# Patient Record
Sex: MALE
[De-identification: names, ages, dates, MRNs, and addresses within clinical notes are randomized per-mention and may not be internally consistent; named-entity substitution may affect disease eponyms.]

---

## 2021-12-13 ENCOUNTER — APPOINTMENT (OUTPATIENT)
Dept: OTOLARYNGOLOGY | Facility: CLINIC | Age: 41
End: 2021-12-13
Payer: COMMERCIAL

## 2021-12-13 DIAGNOSIS — Z82.49 FAMILY HISTORY OF ISCHEMIC HEART DISEASE AND OTHER DISEASES OF THE CIRCULATORY SYSTEM: ICD-10-CM

## 2021-12-13 DIAGNOSIS — J30.2 OTHER SEASONAL ALLERGIC RHINITIS: ICD-10-CM

## 2021-12-13 DIAGNOSIS — Z80.9 FAMILY HISTORY OF MALIGNANT NEOPLASM, UNSPECIFIED: ICD-10-CM

## 2021-12-13 PROBLEM — Z00.00 ENCOUNTER FOR PREVENTIVE HEALTH EXAMINATION: Status: ACTIVE | Noted: 2021-12-13

## 2021-12-13 PROCEDURE — 99204 OFFICE O/P NEW MOD 45 MIN: CPT | Mod: 25

## 2021-12-13 PROCEDURE — 31231 NASAL ENDOSCOPY DX: CPT

## 2021-12-13 RX ORDER — TRIAMCINOLONE ACETONIDE 55 UG/1
55 SPRAY, METERED NASAL
Refills: 0 | Status: ACTIVE | COMMUNITY

## 2021-12-13 NOTE — CONSULT LETTER
[Dear  ___] : Dear  [unfilled], [Consult Letter:] : I had the pleasure of evaluating your patient, [unfilled]. [Please see my note below.] : Please see my note below. [Consult Closing:] : Thank you very much for allowing me to participate in the care of this patient.  If you have any questions, please do not hesitate to contact me. [Sincerely,] : Sincerely, [FreeTextEntry3] : Irene Jeong MD\par

## 2021-12-13 NOTE — ASSESSMENT
[FreeTextEntry1] : He has a history of allergies and nasal surgery (septoplasty and middle turbinate resection). He developed a sinus infection in early October. He took antibiotics and had some improvement but still has persistent symptoms. On nasal endoscopy, he has evidence of prior middle turbinate resection. It is unclear if he had sinus surgery as well. There was some scarring in the middle meatus but the uncinate process, at least on the right side, appeared intact. There was a little bit of drainage on the right side which was cultured.\par \par PLAN\par \par -findings and management options discussed in detail with the patient. \par -Nasal saline rinses, nasal steroid spray, antihistamine/decongestant as needed\par -I am going to place him on a course of doxycycline. He said in the past he had had antibiotics changed as well. He does not like the way oral steroids make him feel so I will hold off on that\par -they were asked to call for the results of the culture in the next few days\par -Consider CT scan of the sinuses depending on how he does\par -He will call Friday for the culture results. He will call me in one to 2 weeks and let me know how he is feeling. If he is not feeling better, I will send him for the CT scan\par -call and return earlier if any concerns. \par

## 2021-12-13 NOTE — HISTORY OF PRESENT ILLNESS
[de-identified] : HAYDEE CAIN is a 41 year patient with history of allergies, chronic sinusitis, and nasal/sinus surgery. He said that he developed an upper respiratory tract infection in early October. After 3 weeks, he went to urgent care and was given Augmentin. He ended up taking it for 7 days and then a 10 day course of a stronger dose. He still has right sinus pressure, cough, postnasal drip, and nasal drainage. He uses Nasacort. He gets about one sinus infection every other year. He sees Dr. Marroquin for allergies. He gets immunotherapy.\par \par He had septoplasty in 1998 in [place].

## 2021-12-21 LAB — EAR NOSE AND THROAT CULTURE: ABNORMAL

## 2021-12-29 ENCOUNTER — APPOINTMENT (OUTPATIENT)
Dept: OTOLARYNGOLOGY | Facility: CLINIC | Age: 41
End: 2021-12-29
Payer: COMMERCIAL

## 2021-12-29 PROCEDURE — 99214 OFFICE O/P EST MOD 30 MIN: CPT | Mod: 25

## 2021-12-29 PROCEDURE — 31231 NASAL ENDOSCOPY DX: CPT

## 2021-12-29 RX ORDER — SULFAMETHOXAZOLE AND TRIMETHOPRIM 800; 160 MG/1; MG/1
800-160 TABLET ORAL TWICE DAILY
Qty: 20 | Refills: 0 | Status: COMPLETED | COMMUNITY
Start: 2021-12-16 | End: 2021-12-29

## 2021-12-29 RX ORDER — DOXYCYCLINE 100 MG/1
100 CAPSULE ORAL TWICE DAILY
Qty: 20 | Refills: 0 | Status: COMPLETED | COMMUNITY
Start: 2021-12-13 | End: 2021-12-29

## 2021-12-29 NOTE — HISTORY OF PRESENT ILLNESS
[de-identified] : HAYDEE CAIN is a 41 year patient is here for follow up for sinusitis. He took doxycycline which was then changed to bactrim based on the culture results. Culture grew rare Klebsiella aerogenes. He is somewhat better but still has right sinus pressure and postnasal drip. He also had a "tingling" sensation.  He is using Nasacort.  His symptoms started in October. \par \par ENT history\par He sees Dr. Marroquin for allergies and gets immunotherapy\par He had septoplasty in [place] in 1998

## 2021-12-29 NOTE — CONSULT LETTER
[Dear  ___] : Dear  [unfilled], [Courtesy Letter:] : I had the pleasure of seeing your patient, [unfilled], in my office today. [Please see my note below.] : Please see my note below. [Consult Closing:] : Thank you very much for allowing me to participate in the care of this patient.  If you have any questions, please do not hesitate to contact me. [Sincerely,] : Sincerely, [FreeTextEntry3] : Irene Jeong MD\par

## 2021-12-29 NOTE — ASSESSMENT
[FreeTextEntry1] : He is feeling somewhat better after taking the antibiotics. However, he still has right facial pressure and tingling sensation. On exam, he has mild edema in the right middle meatus involving the OMC. There was no drainage to culture\par \par PLAN\par \par -findings and management options discussed in detail with the patient. \par -Nasal saline rinses, nasal steroid spray, antihistamine/decongestant as needed\par -I gave him another course of Bactrim to take if needed. He said he may want to try a short burst of oral steroids. He has taken them in the past. He will stop them if he does not like the way they make him feel. I told him to take the medication with food and consider taking Pepcid\par -I am sending him for CT scan of the sinuses\par -Followup after the CT scan\par -call and return earlier if any concerns or worsening symptoms\par

## 2022-01-06 ENCOUNTER — APPOINTMENT (OUTPATIENT)
Dept: CT IMAGING | Facility: CLINIC | Age: 42
End: 2022-01-06
Payer: COMMERCIAL

## 2022-01-06 ENCOUNTER — RESULT REVIEW (OUTPATIENT)
Age: 42
End: 2022-01-06

## 2022-01-06 ENCOUNTER — OUTPATIENT (OUTPATIENT)
Dept: OUTPATIENT SERVICES | Facility: HOSPITAL | Age: 42
LOS: 1 days | End: 2022-01-06

## 2022-01-06 PROCEDURE — 70486 CT MAXILLOFACIAL W/O DYE: CPT | Mod: 26

## 2022-01-10 ENCOUNTER — APPOINTMENT (OUTPATIENT)
Dept: OTOLARYNGOLOGY | Facility: CLINIC | Age: 42
End: 2022-01-10
Payer: COMMERCIAL

## 2022-01-10 PROCEDURE — 99212 OFFICE O/P EST SF 10 MIN: CPT

## 2022-01-10 RX ORDER — SULFAMETHOXAZOLE AND TRIMETHOPRIM 800; 160 MG/1; MG/1
800-160 TABLET ORAL TWICE DAILY
Qty: 20 | Refills: 0 | Status: COMPLETED | COMMUNITY
Start: 2021-12-29 | End: 2022-01-10

## 2022-01-10 RX ORDER — PREDNISONE 10 MG/1
10 TABLET ORAL
Qty: 12 | Refills: 0 | Status: COMPLETED | COMMUNITY
Start: 2021-12-29 | End: 2022-01-10

## 2022-01-10 NOTE — ASSESSMENT
[FreeTextEntry1] : He has been feeling better. His CT scan was reviewed. There was no significant sinus inflammation that there were narrow OMCs\par \par PLAN\par \par -findings and management options discussed in detail with the patient. \par -Nasal saline rinses, nasal steroid spray, antihistamine/decongestant as needed\par -I will monitor his sinuses. If he has recurrent infections, he may benefit from maxillary sinus balloon sinuplasty. We will observe cyst/polyp in the left maxillary sinus\par -Consider neurology evaluation if he continues to have right facial pressure\par -Allergy precautions and allergy management\par -I will see him back in 3-6 months if he is doing well. He will return earlier if he has a sinus infection\par -call and return earlier if any concerns or worsening symptoms\par

## 2022-01-10 NOTE — HISTORY OF PRESENT ILLNESS
[de-identified] : HAYDEE CAIN is a 41 year patient here to review his CT scan.  He has been feeling better. The right facial pressure and tingling sensation have improved. CT scan showed a narrow but patent OMCs, partial middle turbinate resection and a cyst or polyp in the superior aspect of the left maxillary sinus in a segmented compartment\par \par ENT history\par He sees Dr. Marroquin for allergies and gets immunotherapy\par He had septoplasty in [place] in 1998

## 2022-06-13 ENCOUNTER — APPOINTMENT (OUTPATIENT)
Dept: OTOLARYNGOLOGY | Facility: CLINIC | Age: 42
End: 2022-06-13
Payer: COMMERCIAL

## 2022-06-13 DIAGNOSIS — J01.00 ACUTE MAXILLARY SINUSITIS, UNSPECIFIED: ICD-10-CM

## 2022-06-13 PROCEDURE — 31231 NASAL ENDOSCOPY DX: CPT

## 2022-06-13 PROCEDURE — 99213 OFFICE O/P EST LOW 20 MIN: CPT | Mod: 25

## 2022-06-13 RX ORDER — LIDOCAINE HYDROCHLORIDE 20 MG/ML
2 SOLUTION ORAL; TOPICAL
Qty: 200 | Refills: 0 | Status: ACTIVE | COMMUNITY
Start: 2022-06-05

## 2022-06-13 RX ORDER — KETOCONAZOLE 20 MG/G
2 CREAM TOPICAL
Qty: 60 | Refills: 0 | Status: ACTIVE | COMMUNITY
Start: 2022-03-29

## 2022-06-13 RX ORDER — TACROLIMUS 1 MG/G
0.1 OINTMENT TOPICAL
Qty: 60 | Refills: 0 | Status: ACTIVE | COMMUNITY
Start: 2022-03-29

## 2022-06-13 RX ORDER — TRIAMCINOLONE ACETONIDE 1 MG/G
0.1 OINTMENT TOPICAL
Qty: 60 | Refills: 0 | Status: ACTIVE | COMMUNITY
Start: 2022-03-29

## 2022-06-13 NOTE — HISTORY OF PRESENT ILLNESS
[de-identified] : HAYDEE CAIN is a 42 year old patient here for a 4-week history of throat pain/discomfort.  He was not sick when this started.  He has no dysphagia or voice change.  He is not having a lot of nasal obstruction, congestion, or drainage.  He went to urgent care.  He said COVID testing was negative.  He said a rapid strep test was negative as was a throat culture about 2 weeks ago.  He denies history of reflux\par \par ENT history\par He sees Dr. Marroquin for allergies and gets immunotherapy\par He had septoplasty in [place] in 1998 \par CT scan of the sinuses showed a narrow but patent OMCs, partial middle turbinate resection and a cyst or polyp in the superior aspect of the left maxillary sinus in a segmented compartment

## 2022-06-22 ENCOUNTER — APPOINTMENT (OUTPATIENT)
Dept: OTOLARYNGOLOGY | Facility: CLINIC | Age: 42
End: 2022-06-22
Payer: COMMERCIAL

## 2022-06-22 VITALS — WEIGHT: 140 LBS | HEIGHT: 68 IN | TEMPERATURE: 95.4 F | BODY MASS INDEX: 21.22 KG/M2

## 2022-06-22 DIAGNOSIS — Z91.09 OTHER ALLERGY STATUS, OTHER THAN TO DRUGS AND BIOLOGICAL SUBSTANCES: ICD-10-CM

## 2022-06-22 DIAGNOSIS — R07.0 PAIN IN THROAT: ICD-10-CM

## 2022-06-22 PROCEDURE — 99213 OFFICE O/P EST LOW 20 MIN: CPT | Mod: 25

## 2022-06-22 PROCEDURE — 31575 DIAGNOSTIC LARYNGOSCOPY: CPT

## 2022-06-22 NOTE — ASSESSMENT
[FreeTextEntry1] : He has persistent throat discomfort/pain which started approximately 6 weeks ago.  Nasal endoscopy was again unremarkable.  I did send a throat culture to rule out bacterial and fungal infection as well as a right middle meatal culture.  He was concerned about a sinus infection.  However, there is no purulent drainage or congestion seen.  I discussed possible causes of throat pain and nasal congestion including allergies, reflux, and neuralgias.  I discussed the possibility of lesions, TMJ dysfunction or dental source.  \par \par PLAN\par \par -findings and management options discussed in detail with the patient. \par -Nasal saline rinses, nasal steroid spray, antihistamine/decongestant as needed\par -Hydration and avoidance of alcohol based mouthwashes\par -Continue reflux precautions.  He may try Pepcid to see if that helps\par -I asked him to call for the culture results.\par -If his symptoms persist, I recommended pH probe testing to rule out reflux, CT scan, dental evaluation to rule out dental source/TMJ dysfunction and neurology evaluation to rule out other sources\par -I will see how he is feeling when he calls for the culture results.  We will proceed with further work-up if he is not better\par -call and return earlier if any concerns or worsening symptoms\par

## 2022-06-22 NOTE — HISTORY OF PRESENT ILLNESS
[de-identified] : HAYDEE CAIN is a 42 year old patient for follow-up for throat pain.  His symptoms have not changed.  He said that it felt worse over the weekend.  He has a little bit more nasal congestion on the right side and more discomfort.  He has no dysphagia or voice change.  He does have some discomfort when he talks for a long period of time.  He also has some discomfort over the right side of his face.  He has been following reflux precautions.  He did not try any medication for it.  He uses Nasacort and a nasal saline rinse daily.  Nasal endoscopy and flexible laryngoscopy at his last visit were unremarkable.  There was no obvious sinus infection.\par \par He developed throat pain/discomfort approximately 6 weeks ago.  He had no preceding upper respiratory tract infection.  He said a rapid strep test and throat culture were negative.  He said COVID testing was also negative\par \par  ENT history\par He sees Dr. Marroquin for allergies and gets immunotherapy\par He had septoplasty in [place] in 1998 \par CT scan of the sinuses showed a narrow but patent OMCs, partial middle turbinate resection and a cyst or polyp in the superior aspect of the left maxillary sinus in a segmented compartment \par

## 2022-06-27 LAB
BACTERIA THROAT CULT: NORMAL
FUNGUS SPEC CULT ORG #8: ABNORMAL

## 2022-06-27 RX ORDER — NYSTATIN 100000 [USP'U]/ML
100000 SUSPENSION ORAL 3 TIMES DAILY
Qty: 105 | Refills: 1 | Status: ACTIVE | COMMUNITY
Start: 2022-06-27 | End: 1900-01-01

## 2022-06-28 LAB — EAR NOSE AND THROAT CULTURE: ABNORMAL

## 2022-07-11 ENCOUNTER — APPOINTMENT (OUTPATIENT)
Dept: OTOLARYNGOLOGY | Facility: CLINIC | Age: 42
End: 2022-07-11

## 2024-04-16 NOTE — ASSESSMENT
SBAR given to ROSY Lund RN Pre Op Nurse for continuing of care.    [FreeTextEntry1] : He has a 4-week history of throat discomfort.  He said a throat culture was -2 weeks ago.  He does not have a lot of sinus symptoms.  On exam, there was no obvious sinus infection.  Laryngeal exam also appeared normal.\par \par PLAN\par \par -findings and management options discussed in detail with the patient. \par -Nasal saline rinses, nasal steroid spray, antihistamine/decongestant as needed\par -Hydration\par -Avoid alcohol based mouthwashes\par -We discussed the possibility of silent reflux.  I recommended reflux precautions.  He could consider trying OTC medication\par -We also discussed repeat throat culture and/or sinus culture.  There is no purulent drainage to culture on nasal endoscopy.  He had a recent throat culture.  We will hold off on repeating it today.  We also discussed treating him empirically with antibiotics.  We may consider this if he is not improving\par -He will call me or follow-up in 2 weeks if his symptoms have not improved.\par -call and return earlier if any concerns or worsening symptoms\par

## 2024-04-22 ENCOUNTER — APPOINTMENT (OUTPATIENT)
Dept: OTOLARYNGOLOGY | Facility: CLINIC | Age: 44
End: 2024-04-22
Payer: COMMERCIAL

## 2024-04-22 PROCEDURE — 99213 OFFICE O/P EST LOW 20 MIN: CPT

## 2024-04-22 RX ORDER — MUPIROCIN 20 MG/G
2 OINTMENT TOPICAL
Qty: 1 | Refills: 1 | Status: ACTIVE | COMMUNITY
Start: 2024-04-22 | End: 1900-01-01

## 2024-04-22 NOTE — HISTORY OF PRESENT ILLNESS
[de-identified] : HAYDEE CAIN is a 43 year old patient Here for a 4-week history of discomfort inside the right nostril.  He said there is also occasional bleeding.  He denies manipulating the area but he does pluck or trim the nasal hairs.  He uses nasal saline.  He has no sinus symptoms.  He has been doing well since I last saw him  ENT history  He sees Dr. Marroquin for allergies and gets immunotherapy  He had septoplasty in [place] in 1998  CT scan of the sinuses showed a narrow but patent OMCs, partial middle turbinate resection and a cyst or polyp in the superior aspect of the left maxillary sinus in a segmented compartment

## 2024-04-22 NOTE — PHYSICAL EXAM
[TextEntry] : PHYSICAL EXAM  General: The patient was alert, oriented and in no distress. Voice was clear.  Face: The patient had no facial asymmetry or mass. The skin was unremarkable.  Ears: Hearing normal to conversational voice External ears were normal without deformity. Ear canals were clear. No cerumen or inflammation Tympanic membranes were intact and normal. No perforation or effusion. mobile  Nose:  The external nose had no significant deformity.   The nose was evaluated under the microscope.  The left nasal mucosa and nasal vestibule were normal and clear.  The septum was grossly midline.  There were 2 small areas of sores/ulcerations on the anterior nasal septum on the right side.  There was a scab on each 1.  There were no suspicious masses or lesions  Oral cavity: Oral mucosa- normal. Oral and base of tongue- clear and without mass. Gingival and buccal mucosa- moist and without lesions. Palate- the palate moved well. There was no cleft palate. There appeared to be good salivary flow.   Oral cavity/oropharynx- no pus, erythema or mass  Neck:  The neck was symmetrical. The parotid and submandibular glands were normal without masses. The trachea was midline and there was no unusual crepitus. Thyroid was smooth and nontender and no masses were palpated. No masses  Lymphatics: Cervical adenopathy- none.

## 2024-05-13 ENCOUNTER — APPOINTMENT (OUTPATIENT)
Dept: OTOLARYNGOLOGY | Facility: CLINIC | Age: 44
End: 2024-05-13
Payer: COMMERCIAL

## 2024-05-13 PROCEDURE — 30901 CONTROL OF NOSEBLEED: CPT

## 2024-05-13 PROCEDURE — 99213 OFFICE O/P EST LOW 20 MIN: CPT | Mod: 25

## 2024-05-13 NOTE — HISTORY OF PRESENT ILLNESS
[de-identified] : HAYDEE CAIN is a 43 year old patient Here for follow-up for right intranasal discomfort and bleeding.  He has been using the mupirocin ointment but it did not help.  He still has intermittent bleeding.  The pain may be better.  ENT history  He sees Dr. Marroquin for allergies and gets immunotherapy  He had septoplasty in [place] in 1998  CT scan of the sinuses showed a narrow but patent OMCs, partial middle turbinate resection and a cyst or polyp in the superior aspect of the left maxillary sinus in a segmented compartment

## 2024-05-13 NOTE — ASSESSMENT
[FreeTextEntry1] : He has history of chronic rhinitis.  He has had nasal discomfort and bleeding.  On exam today, he had 1 small scab scab on the right anterior nasal septum with evidence of recent bleeding.  Once the scab was removed, there was no suspicious lesion underneath it.  The area was cauterized.  He tolerated it well.  Evaluation with nasal endoscopy showed no other lesions or areas of bleeding  Plan -Findings and management options were discussed with the patient. - Continue to avoid nasal manipulation - I am trying him on bacitracin ointment twice daily - I will see him back in approximately 2 weeks - If there is a persistent sore, we will discuss biopsying the area.  I did not see an obvious mass or lesion on exam today. - He will call me and return earlier if any problems or worsening symptoms

## 2024-05-13 NOTE — PHYSICAL EXAM
[TextEntry] : General: The patient was alert, oriented and in no distress. Voice was clear.  Face: The patient had no facial asymmetry or mass. The skin was unremarkable.  Ears: Hearing normal to conversational voice External ears were normal without deformity. Ear canals were clear. No cerumen or inflammation Tympanic membranes were intact and normal. No perforation or effusion. mobile  Nose: The external nose had no significant deformity. The nose was evaluated under the microscope. The left nasal mucosa and nasal vestibule were normal and clear. The septum was grossly midline. There were 1 small scab on the anterior nasal septum on the right side.  There were no suspicious masses or lesions  Oral cavity: Oral mucosa- normal. Oral and base of tongue- clear and without mass. Gingival and buccal mucosa- moist and without lesions. Palate- the palate moved well. There was no cleft palate. There appeared to be good salivary flow. Oral cavity/oropharynx- no pus, erythema or mass  Neck: The neck was symmetrical. The parotid and submandibular glands were normal without masses. The trachea was midline and there was no unusual crepitus. Thyroid was smooth and nontender and no masses were palpated. No masses  Lymphatics: Cervical adenopathy- none.    PROCEDURE- CAUTERIZATION OF ANTERIOR EPISTAXIS  Indication: Epistaxis Procedure:  cauterization of epistaxis-right anterior nasal septum Surgeon: Dr. Jeong Consent: Options for treatment were reviewed with the patient.  Because of the recurrent bleeding, he opted to proceed with cauterization the risks of the procedure were reviewed and include bleeding, infection, scarring/cosmetic deformity, need for further procedures or packing. The patient wished to proceed.  Verbal consent was obtained. Anesthetic: Topical lidocaine and oxymetazoline.   Procedure: The patient was positioned comfortably. The nose was anesthetized with topical lidocaine and decongested with oxymetazoline. Nasal endoscopy was performed.  There were no masses or suspicious lesions in the nasal cavity, nasopharynx.  After the nose was anesthetized, cauterization of the bleeding source on the right septum was performed using silver nitrate. The patient tolerated the procedure well. There were no complications.

## 2024-05-29 ENCOUNTER — APPOINTMENT (OUTPATIENT)
Dept: OTOLARYNGOLOGY | Facility: CLINIC | Age: 44
End: 2024-05-29
Payer: COMMERCIAL

## 2024-05-29 DIAGNOSIS — J31.0 CHRONIC RHINITIS: ICD-10-CM

## 2024-05-29 DIAGNOSIS — R04.0 EPISTAXIS: ICD-10-CM

## 2024-05-29 DIAGNOSIS — J34.89 OTHER SPECIFIED DISORDERS OF NOSE AND NASAL SINUSES: ICD-10-CM

## 2024-05-29 PROCEDURE — 99213 OFFICE O/P EST LOW 20 MIN: CPT

## 2024-05-29 NOTE — ASSESSMENT
[FreeTextEntry1] : He has been doing well since he underwent cauterization for right epistaxis.  The nasal pain has resolved.  There were no lesions seen.  He has not had any further bleeding.  The mucosa was normal on exam  Plan -Findings and management options were discussed with the patient. - Continue precautions for epistaxis.  Avoid nasal manipulation - Moisturizing nasal gel as needed - I will see him back as needed if he continues to do well.

## 2024-05-29 NOTE — HISTORY OF PRESENT ILLNESS
[de-identified] : HAYDEE CAIN is a 44 year old patient Here for follow-up for right epistaxis.  He has been doing well since cauterization approximately 2 weeks ago.  He has not had any further bleeding or pain.   ENT history  He sees Dr. Marroquin for allergies and gets immunotherapy  He had septoplasty in [place] in 1998  CT scan of the sinuses showed a narrow but patent OMCs, partial middle turbinate resection and a cyst or polyp in the superior aspect of the left maxillary sinus in a segmented compartment

## 2024-05-29 NOTE — PHYSICAL EXAM
[TextEntry] : General: The patient was alert, oriented and in no distress. Voice was clear.  Face: The patient had no facial asymmetry or mass. The skin was unremarkable.  Ears: Hearing normal to conversational voice External ears were normal without deformity. Ear canals were clear. No cerumen or inflammation Tympanic membranes were intact and normal. No perforation or effusion. mobile  Nose: The external nose had no significant deformity. The nose was evaluated under the microscope.  The septum was grossly midline.  There were no lesions or scabs on either side.  The mucosa was normal.  Oral cavity: Oral mucosa- normal. Oral and base of tongue- clear and without mass. Gingival and buccal mucosa- moist and without lesions. Palate- the palate moved well. There was no cleft palate. There appeared to be good salivary flow. Oral cavity/oropharynx- no pus, erythema or mass  Neck: The neck was symmetrical. The parotid and submandibular glands were normal without masses. The trachea was midline and there was no unusual crepitus. Thyroid was smooth and nontender and no masses were palpated. No masses  Lymphatics: Cervical adenopathy- none.